# Patient Record
Sex: MALE | URBAN - METROPOLITAN AREA
[De-identification: names, ages, dates, MRNs, and addresses within clinical notes are randomized per-mention and may not be internally consistent; named-entity substitution may affect disease eponyms.]

---

## 2021-05-06 ENCOUNTER — OFFICE VISIT (OUTPATIENT)
Dept: URBAN - METROPOLITAN AREA CLINIC 41 | Facility: CLINIC | Age: 66
End: 2021-05-06
Payer: MEDICARE

## 2021-05-06 DIAGNOSIS — H25.13 AGE-RELATED NUCLEAR CATARACT, BILATERAL: ICD-10-CM

## 2021-05-06 PROCEDURE — 99204 OFFICE O/P NEW MOD 45 MIN: CPT | Performed by: OPHTHALMOLOGY

## 2021-05-06 PROCEDURE — 92134 CPTRZ OPH DX IMG PST SGM RTA: CPT | Performed by: OPHTHALMOLOGY

## 2021-05-06 ASSESSMENT — INTRAOCULAR PRESSURE
OD: 12
OS: 9

## 2021-05-06 NOTE — IMPRESSION/PLAN
Impression: PDR, OS. 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 Exam and OCT reveal no DME OU. Will check IVFA. Thanks, Aurelia Gómez Return in 1 month for follow up, OCT OU, IVFA OS 1st, possible Avastin vs PRP

## 2021-06-03 ENCOUNTER — OFFICE VISIT (OUTPATIENT)
Dept: URBAN - METROPOLITAN AREA CLINIC 41 | Facility: CLINIC | Age: 66
End: 2021-06-03
Payer: MEDICARE

## 2021-06-03 PROCEDURE — 92014 COMPRE OPH EXAM EST PT 1/>: CPT | Performed by: OPHTHALMOLOGY

## 2021-06-03 PROCEDURE — 92134 CPTRZ OPH DX IMG PST SGM RTA: CPT | Performed by: OPHTHALMOLOGY

## 2021-06-03 PROCEDURE — 92235 FLUORESCEIN ANGRPH MLTIFRAME: CPT | Performed by: OPHTHALMOLOGY

## 2021-06-03 ASSESSMENT — INTRAOCULAR PRESSURE
OD: 9
OS: 10

## 2021-06-03 NOTE — IMPRESSION/PLAN
Impression: PDR with DME, OS. VH OS Plan: There is mild VH OS. Exam and OCT reveal extrafoveal DME OS. An IVFA from 06/03/2021 demonstrated no NVE. Fundus Photos from 06/03/2021 demonstrated no NVD. Recommend Avastin series with focal laser and PRP. RBA discussed. Patient elects Avastin today (bilateral). Thanks, Shirley Silva Return in 3 weeks for focal OS, then focal OD, then PRP OS, and in 1 month for follow up, OCT OU, possible Avastin OU Addendum: Return in 3 weeks for focal OS, then focal OD, then PRP OS, then PRP OD, and then in 1 month for follow up, OCT OU, possible Avastin OU

## 2021-06-03 NOTE — IMPRESSION/PLAN
Impression: NPDR, with DME, OD. Plan: DM since 2005. Moderate. Exam and OCT reveal extrafoveal DME OD. An IVFA from 06/03/2021 demonstrated no NVE. Fundus Photos from 06/03/2021 demonstrated no NVD. Recommend Avastin series with focal laser. RBA discussed. Patient elects Avastin today (bilateral)

## 2021-06-25 ENCOUNTER — PROCEDURE (OUTPATIENT)
Dept: URBAN - METROPOLITAN AREA CLINIC 27 | Facility: CLINIC | Age: 66
End: 2021-06-25
Payer: MEDICARE

## 2021-06-25 PROCEDURE — 67210 TREATMENT OF RETINAL LESION: CPT | Performed by: OPHTHALMOLOGY

## 2021-06-25 ASSESSMENT — INTRAOCULAR PRESSURE
OS: 15
OD: 17

## 2021-06-30 ENCOUNTER — PROCEDURE (OUTPATIENT)
Dept: URBAN - METROPOLITAN AREA CLINIC 27 | Facility: CLINIC | Age: 66
End: 2021-06-30
Payer: MEDICARE

## 2021-06-30 PROCEDURE — 67210 TREATMENT OF RETINAL LESION: CPT | Performed by: OPHTHALMOLOGY

## 2021-06-30 ASSESSMENT — INTRAOCULAR PRESSURE
OD: 16
OS: 16

## 2021-07-01 ENCOUNTER — PROCEDURE (OUTPATIENT)
Dept: URBAN - METROPOLITAN AREA CLINIC 41 | Facility: CLINIC | Age: 66
End: 2021-07-01
Payer: MEDICARE

## 2021-07-01 DIAGNOSIS — E11.3592 TYPE 2 DIABETES MELLITUS WITH PROLIFERATIVE DIABETIC RETINOPATHY WITHOUT MACULAR EDEMA, LEFT EYE: Primary | ICD-10-CM

## 2021-07-01 PROCEDURE — 67145 PROPH RTA DTCHMNT PC: CPT | Performed by: OPHTHALMOLOGY

## 2021-07-01 PROCEDURE — 67228 TREATMENT X10SV RETINOPATHY: CPT | Performed by: OPHTHALMOLOGY

## 2021-07-01 ASSESSMENT — INTRAOCULAR PRESSURE
OD: 13
OS: 14

## 2021-07-02 ENCOUNTER — OFFICE VISIT (OUTPATIENT)
Dept: URBAN - METROPOLITAN AREA CLINIC 27 | Facility: CLINIC | Age: 66
End: 2021-07-02
Payer: MEDICARE

## 2021-07-02 PROCEDURE — 67228 TREATMENT X10SV RETINOPATHY: CPT | Performed by: OPHTHALMOLOGY

## 2021-07-02 ASSESSMENT — INTRAOCULAR PRESSURE
OS: 10
OD: 14

## 2021-07-07 ENCOUNTER — OFFICE VISIT (OUTPATIENT)
Dept: URBAN - METROPOLITAN AREA CLINIC 27 | Facility: CLINIC | Age: 66
End: 2021-07-07
Payer: MEDICARE

## 2021-07-07 PROCEDURE — 99024 POSTOP FOLLOW-UP VISIT: CPT | Performed by: OPHTHALMOLOGY

## 2021-07-07 PROCEDURE — 92134 CPTRZ OPH DX IMG PST SGM RTA: CPT | Performed by: OPHTHALMOLOGY

## 2021-07-07 ASSESSMENT — INTRAOCULAR PRESSURE
OD: 12
OS: 11

## 2021-07-07 NOTE — IMPRESSION/PLAN
Impression: NPDR, with DME, OD.
s/p PRP last  06/30/21
s/p focal last 07/01/2021
s/p avastin last 06/03/2021 Plan: DM since 2005. Moderate. Exam and OCT reveal extrafoveal DME OD. An IVFA from 06/03/2021 demonstrated no NVE. Fundus Photos from 06/03/2021 demonstrated no NVD. Recommend Avastin. RBA discussed.  Patient elects Avastin today (bilateral)

## 2021-07-07 NOTE — IMPRESSION/PLAN
Impression: PDR with DME, OS. VH OS
s/p focal last 06/25/2021
s/p PRP last 06/30/2021
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 Exam and OCT reveal extrafoveal DME OS. An IVFA from 06/03/2021 demonstrated no NVE. Fundus Photos from 06/03/2021 demonstrated no NVD. Recommend Avastin. RBA discussed. Patient elects Avastin today (bilateral). Thanks, Jenniffer Kwong Return in 1 month for follow up, OCT OU, possible Avastin OU

## 2021-09-01 ENCOUNTER — OFFICE VISIT (OUTPATIENT)
Dept: URBAN - METROPOLITAN AREA CLINIC 27 | Facility: CLINIC | Age: 66
End: 2021-09-01
Payer: MEDICARE

## 2021-09-01 DIAGNOSIS — E11.3291 TYPE 2 DIAB W MILD NONPRLF DIABETIC RTNOP W/O MACULAR EDEMA, RIGHT EYE: ICD-10-CM

## 2021-09-01 PROCEDURE — 99024 POSTOP FOLLOW-UP VISIT: CPT | Performed by: OPHTHALMOLOGY

## 2021-09-01 PROCEDURE — 92134 CPTRZ OPH DX IMG PST SGM RTA: CPT | Performed by: OPHTHALMOLOGY

## 2021-09-01 ASSESSMENT — INTRAOCULAR PRESSURE
OS: 15
OD: 13

## 2021-09-01 NOTE — IMPRESSION/PLAN
Impression: NPDR, with DME, OD.
s/p PRP last  06/30/21
s/p focal last 07/01/2021
s/p avastin last 07/07/2021  Plan: DM since 2005. Moderate. Exam and OCT reveal extrafoveal DME OD. An IVFA from 06/03/2021 demonstrated no NVE. Fundus Photos from 06/03/2021 demonstrated no NVD. Recommend Avastin. RBA discussed.  Patient elects Avastin today (bilateral)

## 2021-09-01 NOTE — IMPRESSION/PLAN
Impression: PDR with DME, OS. VH OS
s/p focal last 06/25/2021
s/p PRP last 06/30/2021
s/p avastin 07/07/2021  Plan: There is mild VH OS. Exam and OCT reveal extrafoveal DME OS. An IVFA from 06/03/2021 demonstrated no NVE. Fundus Photos from 06/03/2021 demonstrated no NVD. Recommend Avastin. RBA discussed. Patient elects Avastin today (bilateral). Thanks, Nito Charles Return in 1 month for follow up, OCT OU, possible Avastin OU

## 2021-10-06 ENCOUNTER — OFFICE VISIT (OUTPATIENT)
Dept: URBAN - METROPOLITAN AREA CLINIC 27 | Facility: CLINIC | Age: 66
End: 2021-10-06
Payer: MEDICARE

## 2021-10-06 PROCEDURE — 92134 CPTRZ OPH DX IMG PST SGM RTA: CPT | Performed by: OPHTHALMOLOGY

## 2021-10-06 PROCEDURE — 67210 TREATMENT OF RETINAL LESION: CPT | Performed by: OPHTHALMOLOGY

## 2021-10-06 PROCEDURE — 92014 COMPRE OPH EXAM EST PT 1/>: CPT | Performed by: OPHTHALMOLOGY

## 2021-10-06 ASSESSMENT — INTRAOCULAR PRESSURE
OD: 20
OS: 14

## 2021-10-06 NOTE — IMPRESSION/PLAN
Impression: PDR with DME, OS. VH OS
s/p focal last 06/25/2021
s/p PRP last 06/30/2021
s/p avastin 09/01/2021  Plan: There is mild VH OS. Exam and OCT reveal extrafoveal DME OS. An IVFA from 06/03/2021 demonstrated no NVE. Fundus Photos from 06/03/2021 demonstrated no NVD. Recommend focal with Avastin series. RBA discussed. Patient elects focal laser today. Thanks, Tico Donohue Return in 2 days Avastin OU, then Focal OD, then 1 month for follow up, OCT OU, possible Avastin OU

## 2021-10-06 NOTE — IMPRESSION/PLAN
Impression: NPDR, with DME, OD.
s/p PRP last  06/30/21
s/p focal last 07/01/2021
s/p avastin last 09/01/2021  Plan: DM since 2005. Moderate. Exam and OCT reveal extrafoveal DME OD. An IVFA from 06/03/2021 demonstrated no NVE. Fundus Photos from 06/03/2021 demonstrated no NVD. Recommend Avastin series with focal.
Will schedule.

## 2021-10-08 ENCOUNTER — PROCEDURE (OUTPATIENT)
Dept: URBAN - METROPOLITAN AREA CLINIC 27 | Facility: CLINIC | Age: 66
End: 2021-10-08
Payer: MEDICARE

## 2021-10-08 ASSESSMENT — INTRAOCULAR PRESSURE
OS: 8
OD: 9

## 2021-10-20 ENCOUNTER — PROCEDURE (OUTPATIENT)
Dept: URBAN - METROPOLITAN AREA CLINIC 27 | Facility: CLINIC | Age: 66
End: 2021-10-20
Payer: MEDICARE

## 2021-10-20 PROCEDURE — 67210 TREATMENT OF RETINAL LESION: CPT | Performed by: OPHTHALMOLOGY

## 2021-10-20 ASSESSMENT — INTRAOCULAR PRESSURE
OD: 11
OS: 13

## 2021-12-08 ENCOUNTER — OFFICE VISIT (OUTPATIENT)
Dept: URBAN - METROPOLITAN AREA CLINIC 27 | Facility: CLINIC | Age: 66
End: 2021-12-08
Payer: MEDICARE

## 2021-12-08 PROCEDURE — 92134 CPTRZ OPH DX IMG PST SGM RTA: CPT | Performed by: OPHTHALMOLOGY

## 2021-12-08 PROCEDURE — 99024 POSTOP FOLLOW-UP VISIT: CPT | Performed by: OPHTHALMOLOGY

## 2021-12-08 ASSESSMENT — INTRAOCULAR PRESSURE
OD: 18
OS: 19

## 2021-12-08 NOTE — IMPRESSION/PLAN
Impression: PDR with DME, OS. VH OS
s/p focal last 06/25/2021
s/p PRP last 06/30/2021
s/p avastin 10/08/2021 Plan: There is mild VH OS. Exam and OCT reveal extrafoveal DME OS. An IVFA from 06/03/2021 demonstrated no NVE. Fundus Photos from 06/03/2021 demonstrated no NVD. Recommend Avastin. RBA discussed. Patient elects focal Avastin today. Thanks, Zana Valadez Return in 1 month for follow up, OCT OU, possible Avastin OU, IVFA OS 1st

## 2021-12-08 NOTE — IMPRESSION/PLAN
Impression: NPDR, with DME, OD.
s/p PRP last  06/30/21
s/p focal last 10/08/2021
s/p avastin last 10/20/2021 Plan: DM since 2005. Moderate. Exam and OCT reveal extrafoveal DME OD. An IVFA from 06/03/2021 demonstrated no NVE. Fundus Photos from 06/03/2021 demonstrated no NVD. Recommend Avastin. RBA discussed.  The patient elects Avastin (bilateral)

## 2022-01-19 ENCOUNTER — OFFICE VISIT (OUTPATIENT)
Dept: URBAN - METROPOLITAN AREA CLINIC 27 | Facility: CLINIC | Age: 67
End: 2022-01-19
Payer: MEDICARE

## 2022-01-19 PROCEDURE — 92014 COMPRE OPH EXAM EST PT 1/>: CPT | Performed by: OPHTHALMOLOGY

## 2022-01-19 PROCEDURE — 92134 CPTRZ OPH DX IMG PST SGM RTA: CPT | Performed by: OPHTHALMOLOGY

## 2022-01-19 PROCEDURE — 92235 FLUORESCEIN ANGRPH MLTIFRAME: CPT | Performed by: OPHTHALMOLOGY

## 2022-01-19 PROCEDURE — 92250 FUNDUS PHOTOGRAPHY W/I&R: CPT | Performed by: OPHTHALMOLOGY

## 2022-01-19 ASSESSMENT — INTRAOCULAR PRESSURE
OS: 13
OD: 13

## 2022-01-19 NOTE — IMPRESSION/PLAN
Impression: NPDR, with DME, OD.
s/p PRP last  06/30/21
s/p focal last 10/08/2021
s/p avastin last 12/08/2021 Plan: DM since 2005. Moderate. Exam and OCT reveal extrafoveal DME OD. An IVFA from 01/19/2022 demonstrated no NVE. Fundus Photos from 01/19/2022 demonstrated no NVD. Recommend Avastin. RBA discussed.  The patient elects Avastin (bilateral)

## 2022-01-19 NOTE — IMPRESSION/PLAN
Impression: PDR with DME, OS. VH OS
s/p focal last 06/25/2021
s/p PRP last 06/30/2021
s/p avastin 12/08/2021 Plan: There is mild VH OS. Exam and OCT reveal extrafoveal DME OS. An IVFA from 01/19/2022 demonstrated no NVE. Fundus Photos from 01/19/2022 demonstrated no NVD. Recommend Avastin. RBA discussed. Patient elects focal Avastin today. Thanks, [facility] Return in 1 month for follow up, OCT OU, possible Avastin OU

## 2022-02-16 ENCOUNTER — OFFICE VISIT (OUTPATIENT)
Dept: URBAN - METROPOLITAN AREA CLINIC 27 | Facility: CLINIC | Age: 67
End: 2022-02-16
Payer: MEDICARE

## 2022-02-16 DIAGNOSIS — H43.813 VITREOUS DEGENERATION, BILATERAL: ICD-10-CM

## 2022-02-16 PROCEDURE — 67210 TREATMENT OF RETINAL LESION: CPT | Performed by: OPHTHALMOLOGY

## 2022-02-16 PROCEDURE — 92134 CPTRZ OPH DX IMG PST SGM RTA: CPT | Performed by: OPHTHALMOLOGY

## 2022-02-16 PROCEDURE — 92014 COMPRE OPH EXAM EST PT 1/>: CPT | Performed by: OPHTHALMOLOGY

## 2022-02-16 ASSESSMENT — INTRAOCULAR PRESSURE
OS: 12
OD: 14

## 2022-02-16 NOTE — IMPRESSION/PLAN
Impression: PDR with DME, OS. VH OS
s/p focal last 06/25/2021
s/p PRP last 06/30/2021
s/p avastin 01/19/2022 Plan: There is mild VH OS. Exam and OCT reveal extrafoveal DME OS. An IVFA from 01/19/2022 demonstrated no NVE. Fundus Photos from 01/19/2022 demonstrated no NVD. Recommend Avastin series with focal. 
RBA discussed. Patient elects focal today. 


Return in 1 week for Avastin OU, then focal OD, and in 1 month for follow up, OCT OU, possible Avastin OU

## 2022-02-16 NOTE — IMPRESSION/PLAN
Impression: NPDR, with DME, OD.
s/p PRP last  06/30/21
s/p focal last 10/08/2021
s/p avastin last 01/19/2022 Plan: DM since 2005. Moderate. Exam and OCT reveal extrafoveal DME OD. An IVFA from 01/19/2022 demonstrated no NVE. Fundus Photos from 01/19/2022 demonstrated no NVD. Recommend Avastin series with focal.
Will schedule.

## 2022-02-23 ENCOUNTER — PROCEDURE (OUTPATIENT)
Dept: URBAN - METROPOLITAN AREA CLINIC 27 | Facility: CLINIC | Age: 67
End: 2022-02-23
Payer: MEDICARE

## 2022-02-23 DIAGNOSIS — H35.60 RETINAL HEMORRHAGE, UNSPECIFIED EYE: Primary | ICD-10-CM

## 2022-02-23 PROCEDURE — 67210 TREATMENT OF RETINAL LESION: CPT | Performed by: OPHTHALMOLOGY

## 2022-02-23 ASSESSMENT — INTRAOCULAR PRESSURE
OD: 15
OS: 15

## 2022-04-06 ENCOUNTER — OFFICE VISIT (OUTPATIENT)
Dept: URBAN - METROPOLITAN AREA CLINIC 27 | Facility: CLINIC | Age: 67
End: 2022-04-06
Payer: MEDICARE

## 2022-04-06 DIAGNOSIS — H04.123 DRY EYE SYNDROME OF BILATERAL LACRIMAL GLANDS: ICD-10-CM

## 2022-04-06 PROCEDURE — 99024 POSTOP FOLLOW-UP VISIT: CPT | Performed by: OPHTHALMOLOGY

## 2022-04-06 PROCEDURE — 92134 CPTRZ OPH DX IMG PST SGM RTA: CPT | Performed by: OPHTHALMOLOGY

## 2022-04-06 ASSESSMENT — INTRAOCULAR PRESSURE
OS: 15
OD: 19

## 2022-04-06 NOTE — IMPRESSION/PLAN
Impression: NPDR, with DME, OD.
s/p PRP last  06/30/21
s/p focal last 02/23/2022
s/p avastin last 02/16/2022 Plan: DM since 2005. Moderate. Exam and OCT reveal extrafoveal DME OD. An IVFA from 01/19/2022 demonstrated no NVE. Fundus Photos from 01/19/2022 demonstrated no NVD. Recommend Avastin. RBA discussed.  The patient elects Avastin (bilateral)

## 2022-04-06 NOTE — IMPRESSION/PLAN
Impression: PDR with DME, OS.
h/o VH OS
s/p focal last 02/16/2022
s/p PRP last 06/30/2021
s/p avastin 02/16/2022 Plan: Exam and OCT reveal extrafoveal DME OS. An IVFA from 01/19/2022 demonstrated no NVE. Fundus Photos from 01/19/2022 demonstrated no NVD. Recommend Avastin. RBA discussed. The patient elects Avastin (bilateral). 

Return in 1 month for follow up, OCT OU, possible Avastin OU

## 2022-05-06 ENCOUNTER — OFFICE VISIT (OUTPATIENT)
Dept: URBAN - METROPOLITAN AREA CLINIC 27 | Facility: CLINIC | Age: 67
End: 2022-05-06
Payer: MEDICARE

## 2022-05-06 DIAGNOSIS — E11.3592 TYPE 2 DIABETES MELLITUS W/ PROLIFERATIVE DIABETIC RETINOPATHY W/O MACULAR EDEMA, LEFT EYE: Primary | ICD-10-CM

## 2022-05-06 DIAGNOSIS — H25.13 AGE-RELATED NUCLEAR CATARACT, BILATERAL: ICD-10-CM

## 2022-05-06 DIAGNOSIS — H43.813 VITREOUS DEGENERATION, BILATERAL: ICD-10-CM

## 2022-05-06 DIAGNOSIS — E11.3291 TYPE 2 DIAB W MILD NONPRLF DIABETIC RTNOP W/O MACULAR EDEMA, RIGHT EYE: ICD-10-CM

## 2022-05-06 DIAGNOSIS — H04.123 DRY EYE SYNDROME OF BILATERAL LACRIMAL GLANDS: ICD-10-CM

## 2022-05-06 PROCEDURE — 92134 CPTRZ OPH DX IMG PST SGM RTA: CPT | Performed by: OPHTHALMOLOGY

## 2022-05-06 PROCEDURE — 99024 POSTOP FOLLOW-UP VISIT: CPT | Performed by: OPHTHALMOLOGY

## 2022-05-06 ASSESSMENT — INTRAOCULAR PRESSURE
OS: 13
OD: 14

## 2022-05-06 NOTE — IMPRESSION/PLAN
Impression: NPDR, with DME, OD.
s/p PRP last  06/30/21
s/p focal last 02/23/2022
s/p avastin last 04/06/2022 Plan: DM since 2005. Moderate. Exam and OCT reveal extrafoveal DME OD. An IVFA from 01/19/2022 demonstrated no NVE. Fundus Photos from 01/19/2022 demonstrated no NVD. Recommend Avastin. RBA discussed.  The patient elects Avastin (bilateral)

## 2022-05-06 NOTE — IMPRESSION/PLAN
Impression: PDR with DME, OS.
h/o VH OS
s/p focal last 02/16/2022
s/p PRP last 06/30/2021
s/p avastin 04/06/2022 Plan: Exam and OCT reveal extrafoveal DME OS. An IVFA from 01/19/2022 demonstrated no NVE. Fundus Photos from 01/19/2022 demonstrated no NVD. Recommend Avastin. RBA discussed. The patient elects Avastin (bilateral). 

Return in 1 month for follow up, OCT OU, possible Avastin OU

## 2022-06-01 ENCOUNTER — OFFICE VISIT (OUTPATIENT)
Dept: URBAN - METROPOLITAN AREA CLINIC 27 | Facility: CLINIC | Age: 67
End: 2022-06-01
Payer: MEDICARE

## 2022-06-01 DIAGNOSIS — H43.813 VITREOUS DEGENERATION, BILATERAL: ICD-10-CM

## 2022-06-01 DIAGNOSIS — H25.13 AGE-RELATED NUCLEAR CATARACT, BILATERAL: ICD-10-CM

## 2022-06-01 DIAGNOSIS — H04.123 DRY EYE SYNDROME OF BILATERAL LACRIMAL GLANDS: ICD-10-CM

## 2022-06-01 DIAGNOSIS — E11.3592 TYPE 2 DIABETES MELLITUS W/ PROLIFERATIVE DIABETIC RETINOPATHY W/O MACULAR EDEMA, LEFT EYE: Primary | ICD-10-CM

## 2022-06-01 PROCEDURE — 67210 TREATMENT OF RETINAL LESION: CPT | Performed by: OPHTHALMOLOGY

## 2022-06-01 PROCEDURE — 92014 COMPRE OPH EXAM EST PT 1/>: CPT | Performed by: OPHTHALMOLOGY

## 2022-06-01 PROCEDURE — 92134 CPTRZ OPH DX IMG PST SGM RTA: CPT | Performed by: OPHTHALMOLOGY

## 2022-06-01 ASSESSMENT — INTRAOCULAR PRESSURE
OS: 16
OD: 15

## 2022-06-01 NOTE — IMPRESSION/PLAN
Impression: PDR with DME, OS.
h/o VH OS
s/p focal last 02/16/2022
s/p PRP last 06/30/2021
s/p avastin 05/06/2022 Plan: Exam and OCT reveal extrafoveal DME OS. An IVFA from 01/19/2022 demonstrated no NVE. Fundus Photos from 01/19/2022 demonstrated no NVD. Recommend Avastin series with focal. 
RBA discussed. The patient elects Focal laser today. 

Return in 2 days for focal OD, then OCT OU, Avastin OU, and in 1 month for follow up, OCT OU, possible Avastin OU

## 2022-06-01 NOTE — IMPRESSION/PLAN
Impression: NPDR, with DME, OD.
s/p PRP last  06/30/21
s/p Focal last 02/23/2022
s/p Avastin last 05/06/2022 Plan: DM since 2005. Moderate. Exam and OCT reveal extrafoveal DME OD. An IVFA from 01/19/2022 demonstrated no NVE. Fundus Photos from 01/19/2022 demonstrated no NVD. Recommend Avastin series with focal. Will schedule.

## 2022-06-03 ENCOUNTER — OFFICE VISIT (OUTPATIENT)
Dept: URBAN - METROPOLITAN AREA CLINIC 27 | Facility: CLINIC | Age: 67
End: 2022-06-03
Payer: MEDICARE

## 2022-06-03 DIAGNOSIS — E11.3291 TYPE 2 DIABETES MELLITUS WITH MILD NONPROLIFERATIVE DIABETIC RETINOPATHY WITHOUT MACULAR EDEMA, RIGHT EYE: Primary | ICD-10-CM

## 2022-06-03 PROCEDURE — 67210 TREATMENT OF RETINAL LESION: CPT | Performed by: OPHTHALMOLOGY

## 2022-06-03 ASSESSMENT — INTRAOCULAR PRESSURE
OS: 13
OD: 14

## 2022-06-29 ENCOUNTER — OFFICE VISIT (OUTPATIENT)
Dept: URBAN - METROPOLITAN AREA CLINIC 27 | Facility: CLINIC | Age: 67
End: 2022-06-29
Payer: MEDICARE

## 2022-06-29 DIAGNOSIS — E11.3291 TYPE 2 DIAB W MILD NONPRLF DIABETIC RTNOP W/O MACULAR EDEMA, RIGHT EYE: ICD-10-CM

## 2022-06-29 DIAGNOSIS — E11.3592 TYPE 2 DIABETES MELLITUS W/ PROLIFERATIVE DIABETIC RETINOPATHY W/O MACULAR EDEMA, LEFT EYE: Primary | ICD-10-CM

## 2022-06-29 DIAGNOSIS — H04.123 DRY EYE SYNDROME OF BILATERAL LACRIMAL GLANDS: ICD-10-CM

## 2022-06-29 DIAGNOSIS — H43.813 VITREOUS DEGENERATION, BILATERAL: ICD-10-CM

## 2022-06-29 PROCEDURE — 92134 CPTRZ OPH DX IMG PST SGM RTA: CPT | Performed by: OPHTHALMOLOGY

## 2022-06-29 PROCEDURE — 99024 POSTOP FOLLOW-UP VISIT: CPT | Performed by: OPHTHALMOLOGY

## 2022-06-29 ASSESSMENT — INTRAOCULAR PRESSURE
OS: 13
OD: 20

## 2022-06-29 NOTE — IMPRESSION/PLAN
Impression: PDR with DME, OS.
h/o VH OS
s/p focal last 06/01/2022
s/p PRP last 06/30/2021
s/p avastin 05/06/2022 Plan: Exam and OCT reveal extrafoveal DME OS. An IVFA from 01/19/2022 demonstrated no NVE. Fundus Photos from 01/19/2022 demonstrated no NVD. Recommend Avastin. RBA discussed. The patient elects Avastin (bilateral) Return in 1 month for follow up, OCT OU, possible Avastin OU

## 2022-06-29 NOTE — IMPRESSION/PLAN
Impression: NPDR, with DME, OD.
s/p PRP last  06/30/21
s/p Focal last 06/03/2022
s/p Avastin last 05/06/2022 Plan: DM since 2005. Moderate. Exam and OCT reveal extrafoveal DME OD. An IVFA from 01/19/2022 demonstrated no NVE. Fundus Photos from 01/19/2022 demonstrated no NVD. Recommend Avastin. RBA discussed. The patient elects Avastin (bilateral)

## 2022-08-17 ENCOUNTER — OFFICE VISIT (OUTPATIENT)
Dept: URBAN - METROPOLITAN AREA CLINIC 27 | Facility: CLINIC | Age: 67
End: 2022-08-17
Payer: MEDICARE

## 2022-08-17 DIAGNOSIS — E11.3592 TYPE 2 DIABETES MELLITUS W/ PROLIFERATIVE DIABETIC RETINOPATHY W/O MACULAR EDEMA, LEFT EYE: Primary | ICD-10-CM

## 2022-08-17 DIAGNOSIS — E11.3291 TYPE 2 DIAB W MILD NONPRLF DIABETIC RTNOP W/O MACULAR EDEMA, RIGHT EYE: ICD-10-CM

## 2022-08-17 DIAGNOSIS — H04.123 DRY EYE SYNDROME OF BILATERAL LACRIMAL GLANDS: ICD-10-CM

## 2022-08-17 DIAGNOSIS — H25.13 AGE-RELATED NUCLEAR CATARACT, BILATERAL: ICD-10-CM

## 2022-08-17 DIAGNOSIS — H43.813 VITREOUS DEGENERATION, BILATERAL: ICD-10-CM

## 2022-08-17 PROCEDURE — 92134 CPTRZ OPH DX IMG PST SGM RTA: CPT | Performed by: OPHTHALMOLOGY

## 2022-08-17 PROCEDURE — 99024 POSTOP FOLLOW-UP VISIT: CPT | Performed by: OPHTHALMOLOGY

## 2022-08-17 ASSESSMENT — INTRAOCULAR PRESSURE
OS: 13
OD: 15

## 2022-08-17 NOTE — IMPRESSION/PLAN
Impression: PDR with DME, OS.
h/o VH OS
s/p focal last 06/01/2022
s/p PRP last 06/30/2021
s/p avastin 06/29/2022 Plan: Exam and OCT reveal extrafoveal DME OS. An IVFA from 01/19/2022 demonstrated no NVE. Fundus Photos from 01/19/2022 demonstrated no NVD. Recommend Avastin. RBA discussed. The patient elects Avastin (bilateral) Return in 1 month for follow up, OCT OU, possible Avastin OU, IVFA OS 1st

## 2022-08-17 NOTE — IMPRESSION/PLAN
Impression: NPDR, with DME, OD.
s/p PRP last  06/30/21
s/p Focal last 06/03/2022
s/p Avastin last 06/29/2022 Plan: DM since 2005. Moderate. Exam and OCT reveal extrafoveal DME OD. An IVFA from 01/19/2022 demonstrated no NVE. Fundus Photos from 01/19/2022 demonstrated no NVD. Recommend Avastin. RBA discussed.  The patient elects Avastin (bilateral)

## 2022-09-16 ENCOUNTER — OFFICE VISIT (OUTPATIENT)
Dept: URBAN - METROPOLITAN AREA CLINIC 27 | Facility: CLINIC | Age: 67
End: 2022-09-16
Payer: MEDICARE

## 2022-09-16 DIAGNOSIS — E11.3291 TYPE 2 DIAB W MILD NONPRLF DIABETIC RTNOP W/O MACULAR EDEMA, RIGHT EYE: ICD-10-CM

## 2022-09-16 DIAGNOSIS — H43.813 VITREOUS DEGENERATION, BILATERAL: ICD-10-CM

## 2022-09-16 DIAGNOSIS — H25.13 AGE-RELATED NUCLEAR CATARACT, BILATERAL: ICD-10-CM

## 2022-09-16 DIAGNOSIS — H04.123 DRY EYE SYNDROME OF BILATERAL LACRIMAL GLANDS: ICD-10-CM

## 2022-09-16 DIAGNOSIS — E11.3592 TYPE 2 DIABETES MELLITUS W/ PROLIFERATIVE DIABETIC RETINOPATHY W/O MACULAR EDEMA, LEFT EYE: Primary | ICD-10-CM

## 2022-09-16 PROCEDURE — 92235 FLUORESCEIN ANGRPH MLTIFRAME: CPT | Performed by: OPHTHALMOLOGY

## 2022-09-16 PROCEDURE — 92014 COMPRE OPH EXAM EST PT 1/>: CPT | Performed by: OPHTHALMOLOGY

## 2022-09-16 PROCEDURE — 92134 CPTRZ OPH DX IMG PST SGM RTA: CPT | Performed by: OPHTHALMOLOGY

## 2022-09-16 ASSESSMENT — INTRAOCULAR PRESSURE
OS: 12
OD: 21

## 2022-09-16 NOTE — IMPRESSION/PLAN
Impression: NPDR, with DME, OD.
s/p PRP last  06/30/21
s/p Focal last 06/03/2022
s/p Avastin last 08/17/2022 Plan: DM since 2005. Moderate. Exam and OCT reveal extrafoveal DME OD. An IVFA from 09/16/2022 demonstrated no NVE. Fundus Photos from 09/16/2022 demonstrated no NVD. Recommend Avastin series with focal. 

RBA discussed.  The patient elects Avastin (bilateral)

## 2022-09-16 NOTE — IMPRESSION/PLAN
Impression: PDR with DME, OS.
h/o VH OS
s/p focal last 06/01/2022
s/p PRP last 06/30/2021
s/p avastin  08/17/2022 Plan: Exam and OCT reveal extrafoveal DME OS. An IVFA from 09/16/2022 demonstrated no NVE. Fundus Photos from 09/16/2022 demonstrated no NVD. Recommend Avastin series with focal. 
RBA discussed. The patient elects Avastin (bilateral) Return in 2 weeks for focal OS, then focal OD, and in 1 month for follow up, OCT OU, possible Avastin OU

## 2022-10-05 ENCOUNTER — OFFICE VISIT (OUTPATIENT)
Dept: URBAN - METROPOLITAN AREA CLINIC 27 | Facility: CLINIC | Age: 67
End: 2022-10-05
Payer: MEDICARE

## 2022-10-05 DIAGNOSIS — E11.3592 TYPE 2 DIABETES MELLITUS WITH PROLIFERATIVE DIABETIC RETINOPATHY WITHOUT MACULAR EDEMA, LEFT EYE: Primary | ICD-10-CM

## 2022-10-05 PROCEDURE — 67210 TREATMENT OF RETINAL LESION: CPT | Performed by: OPHTHALMOLOGY

## 2022-10-05 ASSESSMENT — INTRAOCULAR PRESSURE
OD: 20
OS: 17

## 2022-10-07 ENCOUNTER — PROCEDURE (OUTPATIENT)
Dept: URBAN - METROPOLITAN AREA CLINIC 27 | Facility: CLINIC | Age: 67
End: 2022-10-07
Payer: MEDICARE

## 2022-10-07 DIAGNOSIS — E11.3291 TYPE 2 DIABETES MELLITUS WITH MILD NONPROLIFERATIVE DIABETIC RETINOPATHY WITHOUT MACULAR EDEMA, RIGHT EYE: Primary | ICD-10-CM

## 2022-10-07 PROCEDURE — 67210 TREATMENT OF RETINAL LESION: CPT | Performed by: OPHTHALMOLOGY

## 2022-10-07 ASSESSMENT — INTRAOCULAR PRESSURE
OS: 16
OD: 13

## 2022-10-28 ENCOUNTER — OFFICE VISIT (OUTPATIENT)
Dept: URBAN - METROPOLITAN AREA CLINIC 27 | Facility: CLINIC | Age: 67
End: 2022-10-28
Payer: MEDICARE

## 2022-10-28 DIAGNOSIS — H04.123 DRY EYE SYNDROME OF BILATERAL LACRIMAL GLANDS: ICD-10-CM

## 2022-10-28 DIAGNOSIS — H25.13 AGE-RELATED NUCLEAR CATARACT, BILATERAL: ICD-10-CM

## 2022-10-28 DIAGNOSIS — E11.3592 TYPE 2 DIABETES MELLITUS W/ PROLIFERATIVE DIABETIC RETINOPATHY W/O MACULAR EDEMA, LEFT EYE: Primary | ICD-10-CM

## 2022-10-28 DIAGNOSIS — E11.3291 TYPE 2 DIAB W MILD NONPRLF DIABETIC RTNOP W/O MACULAR EDEMA, RIGHT EYE: ICD-10-CM

## 2022-10-28 DIAGNOSIS — H43.813 VITREOUS DEGENERATION, BILATERAL: ICD-10-CM

## 2022-10-28 PROCEDURE — 99024 POSTOP FOLLOW-UP VISIT: CPT | Performed by: OPHTHALMOLOGY

## 2022-10-28 PROCEDURE — 92134 CPTRZ OPH DX IMG PST SGM RTA: CPT | Performed by: OPHTHALMOLOGY

## 2022-10-28 ASSESSMENT — INTRAOCULAR PRESSURE
OD: 14
OS: 14

## 2022-10-28 NOTE — IMPRESSION/PLAN
Impression: PDR with DME, OS.
h/o VH OS
s/p focal last 10/05/2022 
s/p PRP last 06/30/2021
s/p avastin  09/16/2022 Plan: Exam and OCT reveal extrafoveal DME OS. An IVFA from 09/16/2022 demonstrated no NVE. Fundus Photos from 09/16/2022 demonstrated no NVD. Recommend Avastin. RBA discussed. The patient elects Avastin (bilateral) Return in  1 month for follow up, OCT OU, possible Avastin OU

## 2022-10-28 NOTE — IMPRESSION/PLAN
Impression: NPDR, with DME, OD.
s/p PRP last  06/30/21
s/p Focal last 10/07/2022 
s/p Avastin last 09/16/2022 Plan: DM since 2005. Moderate. Exam and OCT reveal extrafoveal DME OD. An IVFA from 09/16/2022 demonstrated no NVE. Fundus Photos from 09/16/2022 demonstrated no NVD. Recommend Avastin. RBA discussed.  The patient elects Avastin (bilateral)

## 2023-01-20 ENCOUNTER — OFFICE VISIT (OUTPATIENT)
Dept: URBAN - METROPOLITAN AREA CLINIC 27 | Facility: CLINIC | Age: 68
End: 2023-01-20
Payer: MEDICARE

## 2023-01-20 DIAGNOSIS — E11.3291 TYPE 2 DIAB W MILD NONPRLF DIABETIC RTNOP W/O MACULAR EDEMA, RIGHT EYE: ICD-10-CM

## 2023-01-20 DIAGNOSIS — E11.3592 TYPE 2 DIABETES MELLITUS W/ PROLIFERATIVE DIABETIC RETINOPATHY W/O MACULAR EDEMA, LEFT EYE: Primary | ICD-10-CM

## 2023-01-20 DIAGNOSIS — H25.13 AGE-RELATED NUCLEAR CATARACT, BILATERAL: ICD-10-CM

## 2023-01-20 DIAGNOSIS — H04.123 DRY EYE SYNDROME OF BILATERAL LACRIMAL GLANDS: ICD-10-CM

## 2023-01-20 DIAGNOSIS — H43.813 VITREOUS DEGENERATION, BILATERAL: ICD-10-CM

## 2023-01-20 PROCEDURE — 67210 TREATMENT OF RETINAL LESION: CPT | Performed by: OPHTHALMOLOGY

## 2023-01-20 PROCEDURE — 92134 CPTRZ OPH DX IMG PST SGM RTA: CPT | Performed by: OPHTHALMOLOGY

## 2023-01-20 PROCEDURE — 92014 COMPRE OPH EXAM EST PT 1/>: CPT | Performed by: OPHTHALMOLOGY

## 2023-01-20 ASSESSMENT — INTRAOCULAR PRESSURE
OS: 17
OD: 18

## 2023-01-20 NOTE — IMPRESSION/PLAN
Impression: NPDR, with DME, OD.
s/p PRP last  06/30/21
s/p Focal last 10/07/2022 
s/p Avastin last 12/09/2022 Plan: DM since 2005. Moderate. Exam and OCT reveal extrafoveal DME OD. An IVFA from 09/16/2022 demonstrated no NVE. Fundus Photos from 09/16/2022 demonstrated no NVD. Recommend Avastin series with focal. 
Will schedule.

## 2023-01-20 NOTE — IMPRESSION/PLAN
Impression: PDR with DME, OS.
h/o VH OS
s/p focal last 10/05/2022 
s/p PRP last 06/30/2021
s/p avastin  12/09/2022 Plan: Exam and OCT reveal extrafoveal DME OS. An IVFA from 09/16/2022 demonstrated no NVE. Fundus Photos from 09/16/2022 demonstrated no NVD. Recommend focal with Avastin series. RBA discussed. The patient elects focal laser today.  

Return in  1 week Avastin OU, then Focal OD, then in 1 month for follow up, OCT OU, possible Avastin OU, IVFA OS 1st

## 2023-01-27 ENCOUNTER — OFFICE VISIT (OUTPATIENT)
Dept: URBAN - METROPOLITAN AREA CLINIC 27 | Facility: CLINIC | Age: 68
End: 2023-01-27
Payer: MEDICARE

## 2023-01-27 DIAGNOSIS — E11.3311 TYPE 2 DIABETES MELLITUS WITH MODERATE NONPROLIFERATIVE DIABETIC RETINOPATHY WITH MACULAR EDEMA, RIGHT EYE: Primary | ICD-10-CM

## 2023-01-27 DIAGNOSIS — E11.3512 TYPE 2 DIABETES MELLITUS WITH PROLIFERATIVE DIABETIC RETINOPATHY WITH MACULAR EDEMA, LEFT EYE: ICD-10-CM

## 2023-01-27 ASSESSMENT — INTRAOCULAR PRESSURE
OD: 16
OS: 15

## 2023-02-01 ENCOUNTER — PROCEDURE (OUTPATIENT)
Dept: URBAN - METROPOLITAN AREA CLINIC 27 | Facility: CLINIC | Age: 68
End: 2023-02-01
Payer: MEDICARE

## 2023-02-01 DIAGNOSIS — E11.3311 TYPE 2 DIABETES MELLITUS WITH MODERATE NONPROLIFERATIVE DIABETIC RETINOPATHY WITH MACULAR EDEMA, RIGHT EYE: Primary | ICD-10-CM

## 2023-02-01 PROCEDURE — 67210 TREATMENT OF RETINAL LESION: CPT | Performed by: OPHTHALMOLOGY

## 2023-02-01 ASSESSMENT — INTRAOCULAR PRESSURE
OD: 8
OS: 9

## 2023-02-24 ENCOUNTER — OFFICE VISIT (OUTPATIENT)
Dept: URBAN - METROPOLITAN AREA CLINIC 27 | Facility: CLINIC | Age: 68
End: 2023-02-24
Payer: MEDICARE

## 2023-02-24 DIAGNOSIS — H04.123 DRY EYE SYNDROME OF BILATERAL LACRIMAL GLANDS: ICD-10-CM

## 2023-02-24 DIAGNOSIS — H25.13 AGE-RELATED NUCLEAR CATARACT, BILATERAL: ICD-10-CM

## 2023-02-24 DIAGNOSIS — E11.3311 TYPE 2 DIAB WITH MOD NONP RTNOP WITH MACULAR EDEMA, R EYE: ICD-10-CM

## 2023-02-24 DIAGNOSIS — H43.813 VITREOUS DEGENERATION, BILATERAL: ICD-10-CM

## 2023-02-24 DIAGNOSIS — E11.3512 TYPE 2 DIAB WITH PROLIF DIAB RTNOP WITH MACULAR EDEMA, L EYE: Primary | ICD-10-CM

## 2023-02-24 PROCEDURE — 99024 POSTOP FOLLOW-UP VISIT: CPT | Performed by: OPHTHALMOLOGY

## 2023-02-24 PROCEDURE — 92235 FLUORESCEIN ANGRPH MLTIFRAME: CPT | Performed by: OPHTHALMOLOGY

## 2023-02-24 PROCEDURE — 92134 CPTRZ OPH DX IMG PST SGM RTA: CPT | Performed by: OPHTHALMOLOGY

## 2023-02-24 ASSESSMENT — INTRAOCULAR PRESSURE
OS: 16
OD: 14

## 2023-02-24 NOTE — IMPRESSION/PLAN
Impression: PDR with DME, OS.
h/o VH OS
s/p focal last 01/20/2023
s/p PRP last 06/30/2021
s/p avastin  01/27/2023 Plan: Exam and OCT reveal extrafoveal DME OS. An IVFA from 02/24/2023 demonstrated no NVE. Fundus Photos from 02/24/2023 demonstrated no NVD. Recommend Avastin. RBA discussed. The patient elects Avastin  today.  

Return in  1 month for follow up, OCT OU, possible Avastin OU

## 2023-02-24 NOTE — IMPRESSION/PLAN
Impression: NPDR, with DME, OD.
s/p PRP last  06/30/21
s/p Focal last 02/01/2023
s/p Avastin last 12/09/2022 Plan: DM since 2005. Moderate. Exam and OCT reveal extrafoveal DME OD. An IVFA from 02/24/2023 demonstrated no NVE. Fundus Photos from 02/24/2023 demonstrated no NVD. Recommend Avastin. RBA discussed.  The patient elects Avastin

## 2023-04-07 ENCOUNTER — OFFICE VISIT (OUTPATIENT)
Dept: URBAN - METROPOLITAN AREA CLINIC 27 | Facility: CLINIC | Age: 68
End: 2023-04-07
Payer: MEDICARE

## 2023-04-07 DIAGNOSIS — H43.813 VITREOUS DEGENERATION, BILATERAL: ICD-10-CM

## 2023-04-07 DIAGNOSIS — E11.3311 TYPE 2 DIAB WITH MOD NONP RTNOP WITH MACULAR EDEMA, R EYE: ICD-10-CM

## 2023-04-07 DIAGNOSIS — H04.123 DRY EYE SYNDROME OF BILATERAL LACRIMAL GLANDS: ICD-10-CM

## 2023-04-07 DIAGNOSIS — H25.13 AGE-RELATED NUCLEAR CATARACT, BILATERAL: ICD-10-CM

## 2023-04-07 DIAGNOSIS — E11.3512 TYPE 2 DIAB WITH PROLIF DIAB RTNOP WITH MACULAR EDEMA, L EYE: Primary | ICD-10-CM

## 2023-04-07 PROCEDURE — 99024 POSTOP FOLLOW-UP VISIT: CPT | Performed by: OPHTHALMOLOGY

## 2023-04-07 PROCEDURE — 92134 CPTRZ OPH DX IMG PST SGM RTA: CPT | Performed by: OPHTHALMOLOGY

## 2023-04-07 ASSESSMENT — INTRAOCULAR PRESSURE
OD: 14
OS: 15

## 2023-04-07 NOTE — IMPRESSION/PLAN
Impression: PDR with DME, OS.
h/o VH OS
s/p focal last 01/20/2023
s/p PRP last 06/30/2021
s/p avastin  02/24/2023  Plan: Exam and OCT reveal extrafoveal DME OS. An IVFA from 02/24/2023 demonstrated no NVE. Fundus Photos from 02/24/2023 demonstrated no NVD. Recommend Avastin series with focal.
RBA discussed. The patient elects Avastin  today. Carotenoids. 

Return in 4 weeks for focal OS, then focal OD, and in 8 weeks for follow up, OCT OU, possible Avastin OU

## 2023-04-07 NOTE — IMPRESSION/PLAN
Impression: NPDR, with DME, OD.
s/p PRP last  06/30/21
s/p Focal last 02/01/2023
s/p Avastin last 02/24/2023  Plan: DM since 2005. Moderate. Exam and OCT reveal extrafoveal DME OD. An IVFA from 02/24/2023 demonstrated no NVE. Fundus Photos from 02/24/2023 demonstrated no NVD. Recommend Avastin series with focal. 
RBA discussed.  The patient elects Avastin

## 2023-05-12 ENCOUNTER — PROCEDURE (OUTPATIENT)
Dept: URBAN - METROPOLITAN AREA CLINIC 27 | Facility: CLINIC | Age: 68
End: 2023-05-12
Payer: MEDICARE

## 2023-05-12 DIAGNOSIS — E11.3512 TYPE 2 DIABETES MELLITUS WITH PROLIFERATIVE DIABETIC RETINOPATHY WITH MACULAR EDEMA, LEFT EYE: Primary | ICD-10-CM

## 2023-05-12 PROCEDURE — 67210 TREATMENT OF RETINAL LESION: CPT | Performed by: OPHTHALMOLOGY

## 2023-05-12 ASSESSMENT — INTRAOCULAR PRESSURE
OD: 18
OS: 17

## 2023-05-19 ENCOUNTER — PROCEDURE (OUTPATIENT)
Dept: URBAN - METROPOLITAN AREA CLINIC 27 | Facility: CLINIC | Age: 68
End: 2023-05-19
Payer: MEDICARE

## 2023-05-19 DIAGNOSIS — E11.3291 TYPE 2 DIABETES MELLITUS WITH MILD NONPROLIFERATIVE DIABETIC RETINOPATHY WITHOUT MACULAR EDEMA, RIGHT EYE: Primary | ICD-10-CM

## 2023-05-19 PROCEDURE — 67210 TREATMENT OF RETINAL LESION: CPT | Performed by: OPHTHALMOLOGY

## 2023-05-19 ASSESSMENT — INTRAOCULAR PRESSURE
OS: 17
OD: 21

## 2023-06-01 ENCOUNTER — OFFICE VISIT (OUTPATIENT)
Dept: URBAN - METROPOLITAN AREA CLINIC 27 | Facility: CLINIC | Age: 68
End: 2023-06-01
Payer: MEDICARE

## 2023-06-01 DIAGNOSIS — H25.13 AGE-RELATED NUCLEAR CATARACT, BILATERAL: ICD-10-CM

## 2023-06-01 DIAGNOSIS — E11.3311 TYPE 2 DIAB WITH MOD NONP RTNOP WITH MACULAR EDEMA, R EYE: ICD-10-CM

## 2023-06-01 DIAGNOSIS — H43.813 VITREOUS DEGENERATION, BILATERAL: ICD-10-CM

## 2023-06-01 DIAGNOSIS — E11.3512 TYPE 2 DIAB WITH PROLIF DIAB RTNOP WITH MACULAR EDEMA, L EYE: ICD-10-CM

## 2023-06-01 DIAGNOSIS — H04.123 DRY EYE SYNDROME OF BILATERAL LACRIMAL GLANDS: ICD-10-CM

## 2023-06-01 DIAGNOSIS — E11.3291 TYPE 2 DIABETES MELLITUS WITH MILD NONPROLIFERATIVE DIABETIC RETINOPATHY WITHOUT MACULAR EDEMA, RIGHT EYE: Primary | ICD-10-CM

## 2023-06-01 PROCEDURE — 99024 POSTOP FOLLOW-UP VISIT: CPT | Performed by: OPHTHALMOLOGY

## 2023-06-01 PROCEDURE — 92134 CPTRZ OPH DX IMG PST SGM RTA: CPT | Performed by: OPHTHALMOLOGY

## 2023-06-01 ASSESSMENT — INTRAOCULAR PRESSURE
OD: 11
OS: 12

## 2023-06-01 NOTE — IMPRESSION/PLAN
Impression: PDR with DME, OS.
h/o VH OS
s/p focal laser 01/20/2023, 05/19/2023 ([facility])
s/p PRP last 06/30/2021
s/p avastin  02/24/2023 Plan: Exam and OCT reveal extrafoveal DME OS. An IVFA from 02/24/2023 demonstrated no NVE. Fundus Photos from 02/24/2023 demonstrated no NVD. Recommend observation. BS/BP control discussed. 

Return in 12 weeks for follow up, OCT OU, possible Avastin OU

## 2023-06-01 NOTE — IMPRESSION/PLAN
Impression: NPDR, with DME, OD.
s/p PRP last  06/30/21
s/p Focal last 02/01/2023, 05/12/2023 ([address] [facility])
s/p Avastin last 02/24/2023 Plan: DM since 2005. Moderate. Exam and OCT reveal extrafoveal DME OD. An IVFA from 02/24/2023 demonstrated no NVE. Fundus Photos from 02/24/2023 demonstrated no NVD. Recommend observation.

## 2023-09-21 ENCOUNTER — OFFICE VISIT (OUTPATIENT)
Dept: URBAN - METROPOLITAN AREA CLINIC 27 | Facility: CLINIC | Age: 68
End: 2023-09-21
Payer: MEDICARE

## 2023-09-21 DIAGNOSIS — H43.813 VITREOUS DEGENERATION, BILATERAL: ICD-10-CM

## 2023-09-21 DIAGNOSIS — H25.13 AGE-RELATED NUCLEAR CATARACT, BILATERAL: ICD-10-CM

## 2023-09-21 DIAGNOSIS — E11.3311 TYPE 2 DIAB WITH MOD NONP RTNOP WITH MACULAR EDEMA, R EYE: ICD-10-CM

## 2023-09-21 DIAGNOSIS — E11.3512 TYPE 2 DIAB WITH PROLIF DIAB RTNOP WITH MACULAR EDEMA, L EYE: Primary | ICD-10-CM

## 2023-09-21 DIAGNOSIS — H04.123 DRY EYE SYNDROME OF BILATERAL LACRIMAL GLANDS: ICD-10-CM

## 2023-09-21 PROCEDURE — 92134 CPTRZ OPH DX IMG PST SGM RTA: CPT | Performed by: OPHTHALMOLOGY

## 2023-09-21 PROCEDURE — 99213 OFFICE O/P EST LOW 20 MIN: CPT | Performed by: OPHTHALMOLOGY

## 2023-09-21 ASSESSMENT — INTRAOCULAR PRESSURE
OS: 13
OD: 15

## 2024-03-21 ENCOUNTER — OFFICE VISIT (OUTPATIENT)
Dept: URBAN - METROPOLITAN AREA CLINIC 27 | Facility: CLINIC | Age: 69
End: 2024-03-21
Payer: MEDICARE

## 2024-03-21 DIAGNOSIS — H25.13 AGE-RELATED NUCLEAR CATARACT, BILATERAL: ICD-10-CM

## 2024-03-21 DIAGNOSIS — E11.3593 TYPE 2 DIAB WITH PROLIF DIAB RTNOP WITHOUT MACULAR EDEMA, BI: Primary | ICD-10-CM

## 2024-03-21 DIAGNOSIS — H43.813 VITREOUS DEGENERATION, BILATERAL: ICD-10-CM

## 2024-03-21 DIAGNOSIS — H04.123 DRY EYE SYNDROME OF BILATERAL LACRIMAL GLANDS: ICD-10-CM

## 2024-03-21 PROCEDURE — 92235 FLUORESCEIN ANGRPH MLTIFRAME: CPT | Performed by: OPHTHALMOLOGY

## 2024-03-21 PROCEDURE — 92014 COMPRE OPH EXAM EST PT 1/>: CPT | Performed by: OPHTHALMOLOGY

## 2024-03-21 PROCEDURE — 92134 CPTRZ OPH DX IMG PST SGM RTA: CPT | Performed by: OPHTHALMOLOGY

## 2024-03-21 ASSESSMENT — INTRAOCULAR PRESSURE
OD: 22
OS: 20

## 2024-09-19 ENCOUNTER — OFFICE VISIT (OUTPATIENT)
Dept: URBAN - METROPOLITAN AREA CLINIC 27 | Facility: CLINIC | Age: 69
End: 2024-09-19
Payer: MEDICARE

## 2024-09-19 DIAGNOSIS — E11.3593 TYPE 2 DIABETES MELLITUS WITH PROLIFERATIVE DIABETIC RETINOPATHY WITHOUT MACULAR EDEMA, BILATERAL: Primary | ICD-10-CM

## 2024-09-19 DIAGNOSIS — H43.813 VITREOUS DEGENERATION, BILATERAL: ICD-10-CM

## 2024-09-19 DIAGNOSIS — H04.123 DRY EYE SYNDROME OF BILATERAL LACRIMAL GLANDS: ICD-10-CM

## 2024-09-19 DIAGNOSIS — H25.13 AGE-RELATED NUCLEAR CATARACT, BILATERAL: ICD-10-CM

## 2024-09-19 PROCEDURE — 92014 COMPRE OPH EXAM EST PT 1/>: CPT | Performed by: OPHTHALMOLOGY

## 2024-09-19 PROCEDURE — 92134 CPTRZ OPH DX IMG PST SGM RTA: CPT | Performed by: OPHTHALMOLOGY

## 2024-09-19 ASSESSMENT — INTRAOCULAR PRESSURE
OS: 19
OD: 18

## 2024-10-30 ENCOUNTER — OFFICE VISIT (OUTPATIENT)
Dept: URBAN - METROPOLITAN AREA CLINIC 41 | Facility: CLINIC | Age: 69
End: 2024-10-30
Payer: MEDICARE

## 2024-10-30 DIAGNOSIS — H04.123 DRY EYE SYNDROME OF BILATERAL LACRIMAL GLANDS: ICD-10-CM

## 2024-10-30 DIAGNOSIS — E11.3593 TYPE 2 DIABETES MELLITUS WITH PROLIFERATIVE DIABETIC RETINOPATHY WITHOUT MACULAR EDEMA, BILATERAL: Primary | ICD-10-CM

## 2024-10-30 DIAGNOSIS — H43.813 VITREOUS DEGENERATION, BILATERAL: ICD-10-CM

## 2024-10-30 DIAGNOSIS — H25.13 AGE-RELATED NUCLEAR CATARACT, BILATERAL: ICD-10-CM

## 2024-10-30 PROCEDURE — 99214 OFFICE O/P EST MOD 30 MIN: CPT | Performed by: OPHTHALMOLOGY

## 2024-10-30 PROCEDURE — 67028 INJECTION EYE DRUG: CPT | Performed by: OPHTHALMOLOGY

## 2024-10-30 PROCEDURE — 92134 CPTRZ OPH DX IMG PST SGM RTA: CPT | Performed by: OPHTHALMOLOGY

## 2024-10-30 ASSESSMENT — INTRAOCULAR PRESSURE
OD: 17
OS: 15

## 2024-12-30 ENCOUNTER — OFFICE VISIT (OUTPATIENT)
Dept: URBAN - METROPOLITAN AREA CLINIC 41 | Facility: CLINIC | Age: 69
End: 2024-12-30
Payer: MEDICARE

## 2024-12-30 DIAGNOSIS — H43.11 VITREOUS HEMORRHAGE, RIGHT EYE: ICD-10-CM

## 2024-12-30 DIAGNOSIS — H04.123 DRY EYE SYNDROME OF BILATERAL LACRIMAL GLANDS: ICD-10-CM

## 2024-12-30 DIAGNOSIS — H25.13 AGE-RELATED NUCLEAR CATARACT, BILATERAL: ICD-10-CM

## 2024-12-30 DIAGNOSIS — H43.813 VITREOUS DEGENERATION, BILATERAL: ICD-10-CM

## 2024-12-30 DIAGNOSIS — E11.3593 TYPE 2 DIABETES MELLITUS WITH PROLIFERATIVE DIABETIC RETINOPATHY WITHOUT MACULAR EDEMA, BILATERAL: Primary | ICD-10-CM

## 2024-12-30 PROCEDURE — 92134 CPTRZ OPH DX IMG PST SGM RTA: CPT | Performed by: OPHTHALMOLOGY

## 2024-12-30 PROCEDURE — 92014 COMPRE OPH EXAM EST PT 1/>: CPT | Performed by: OPHTHALMOLOGY

## 2024-12-30 PROCEDURE — 67028 INJECTION EYE DRUG: CPT | Performed by: OPHTHALMOLOGY

## 2024-12-30 ASSESSMENT — INTRAOCULAR PRESSURE
OD: 18
OS: 14

## 2025-04-30 ENCOUNTER — OFFICE VISIT (OUTPATIENT)
Dept: URBAN - METROPOLITAN AREA CLINIC 41 | Facility: CLINIC | Age: 70
End: 2025-04-30
Payer: MEDICARE

## 2025-04-30 DIAGNOSIS — E11.3593 TYPE 2 DIABETES MELLITUS WITH PROLIFERATIVE DIABETIC RETINOPATHY WITHOUT MACULAR EDEMA, BILATERAL: Primary | ICD-10-CM

## 2025-04-30 DIAGNOSIS — H04.123 DRY EYE SYNDROME OF BILATERAL LACRIMAL GLANDS: ICD-10-CM

## 2025-04-30 DIAGNOSIS — H43.813 VITREOUS DEGENERATION, BILATERAL: ICD-10-CM

## 2025-04-30 DIAGNOSIS — H43.11 VITREOUS HEMORRHAGE, RIGHT EYE: ICD-10-CM

## 2025-04-30 DIAGNOSIS — H25.13 AGE-RELATED NUCLEAR CATARACT, BILATERAL: ICD-10-CM

## 2025-04-30 PROCEDURE — 67028 INJECTION EYE DRUG: CPT | Performed by: OPHTHALMOLOGY

## 2025-04-30 PROCEDURE — 92014 COMPRE OPH EXAM EST PT 1/>: CPT | Performed by: OPHTHALMOLOGY

## 2025-04-30 PROCEDURE — 92235 FLUORESCEIN ANGRPH MLTIFRAME: CPT | Performed by: OPHTHALMOLOGY

## 2025-04-30 PROCEDURE — 92134 CPTRZ OPH DX IMG PST SGM RTA: CPT | Performed by: OPHTHALMOLOGY

## 2025-04-30 ASSESSMENT — INTRAOCULAR PRESSURE
OD: 15
OS: 16

## 2025-06-25 ENCOUNTER — OFFICE VISIT (OUTPATIENT)
Dept: URBAN - METROPOLITAN AREA CLINIC 41 | Facility: CLINIC | Age: 70
End: 2025-06-25
Payer: MEDICARE

## 2025-06-25 DIAGNOSIS — E11.3593 TYPE 2 DIABETES MELLITUS WITH PROLIFERATIVE DIABETIC RETINOPATHY WITHOUT MACULAR EDEMA, BILATERAL: Primary | ICD-10-CM

## 2025-06-25 PROCEDURE — 92134 CPTRZ OPH DX IMG PST SGM RTA: CPT | Performed by: OPHTHALMOLOGY

## 2025-06-25 PROCEDURE — 67228 TREATMENT X10SV RETINOPATHY: CPT | Performed by: OPHTHALMOLOGY

## 2025-06-25 PROCEDURE — 92250 FUNDUS PHOTOGRAPHY W/I&R: CPT | Performed by: OPHTHALMOLOGY

## 2025-06-25 ASSESSMENT — INTRAOCULAR PRESSURE
OS: 20
OD: 14